# Patient Record
Sex: FEMALE | Race: WHITE | NOT HISPANIC OR LATINO | Employment: OTHER
[De-identification: names, ages, dates, MRNs, and addresses within clinical notes are randomized per-mention and may not be internally consistent; named-entity substitution may affect disease eponyms.]

---

## 2017-07-26 ENCOUNTER — RECORDS - HEALTHEAST (OUTPATIENT)
Dept: ADMINISTRATIVE | Facility: OTHER | Age: 62
End: 2017-07-26

## 2017-08-02 ENCOUNTER — RECORDS - HEALTHEAST (OUTPATIENT)
Dept: ADMINISTRATIVE | Facility: OTHER | Age: 62
End: 2017-08-02

## 2017-08-02 LAB
BKR LAB AP ABNORMAL BLEEDING: NO
BKR LAB AP BIRTH CONTROL/HORMONES: NORMAL
BKR LAB AP CERVICAL APPEARANCE: NORMAL
BKR LAB AP GYN ADEQUACY: NORMAL
BKR LAB AP GYN INTERPRETATION: NORMAL
BKR LAB AP HPV REFLEX: NORMAL
BKR LAB AP LMP: NORMAL
BKR LAB AP PATIENT STATUS: NORMAL
BKR LAB AP PREVIOUS ABNORMAL: NORMAL
BKR LAB AP PREVIOUS NORMAL: 2015
HIGH RISK?: NO
PATH REPORT.COMMENTS IMP SPEC: NORMAL
RESULT FLAG (HE HISTORICAL CONVERSION): NORMAL

## 2017-08-15 ENCOUNTER — RECORDS - HEALTHEAST (OUTPATIENT)
Dept: ADMINISTRATIVE | Facility: OTHER | Age: 62
End: 2017-08-15

## 2018-12-13 ENCOUNTER — RECORDS - HEALTHEAST (OUTPATIENT)
Dept: ADMINISTRATIVE | Facility: OTHER | Age: 63
End: 2018-12-13

## 2018-12-13 ENCOUNTER — RECORDS - HEALTHEAST (OUTPATIENT)
Dept: LAB | Facility: CLINIC | Age: 63
End: 2018-12-13

## 2018-12-13 LAB
ALBUMIN SERPL-MCNC: 4 G/DL (ref 3.5–5)
ALP SERPL-CCNC: 77 U/L (ref 45–120)
ALT SERPL W P-5'-P-CCNC: 22 U/L (ref 0–45)
ANION GAP SERPL CALCULATED.3IONS-SCNC: 9 MMOL/L (ref 5–18)
AST SERPL W P-5'-P-CCNC: 22 U/L (ref 0–40)
BASOPHILS # BLD AUTO: 0 THOU/UL (ref 0–0.2)
BASOPHILS NFR BLD AUTO: 0 % (ref 0–2)
BILIRUB SERPL-MCNC: 0.5 MG/DL (ref 0–1)
BUN SERPL-MCNC: 18 MG/DL (ref 8–22)
CALCIUM SERPL-MCNC: 9.6 MG/DL (ref 8.5–10.5)
CHLORIDE BLD-SCNC: 107 MMOL/L (ref 98–107)
CHOLEST SERPL-MCNC: 183 MG/DL
CO2 SERPL-SCNC: 24 MMOL/L (ref 22–31)
CREAT SERPL-MCNC: 0.82 MG/DL (ref 0.6–1.1)
EOSINOPHIL # BLD AUTO: 0 THOU/UL (ref 0–0.4)
EOSINOPHIL NFR BLD AUTO: 0 % (ref 0–6)
ERYTHROCYTE [DISTWIDTH] IN BLOOD BY AUTOMATED COUNT: 12.9 % (ref 11–14.5)
FASTING STATUS PATIENT QL REPORTED: NO
GFR SERPL CREATININE-BSD FRML MDRD: >60 ML/MIN/1.73M2
GLUCOSE BLD-MCNC: 89 MG/DL (ref 70–125)
HCT VFR BLD AUTO: 43.7 % (ref 35–47)
HDLC SERPL-MCNC: 83 MG/DL
HGB BLD-MCNC: 14.1 G/DL (ref 12–16)
LDLC SERPL CALC-MCNC: 91 MG/DL
LYMPHOCYTES # BLD AUTO: 0.9 THOU/UL (ref 0.8–4.4)
LYMPHOCYTES NFR BLD AUTO: 17 % (ref 20–40)
MCH RBC QN AUTO: 30.8 PG (ref 27–34)
MCHC RBC AUTO-ENTMCNC: 32.3 G/DL (ref 32–36)
MCV RBC AUTO: 95 FL (ref 80–100)
MONOCYTES # BLD AUTO: 0.5 THOU/UL (ref 0–0.9)
MONOCYTES NFR BLD AUTO: 9 % (ref 2–10)
NEUTROPHILS # BLD AUTO: 3.8 THOU/UL (ref 2–7.7)
NEUTROPHILS NFR BLD AUTO: 73 % (ref 50–70)
PLATELET # BLD AUTO: 232 THOU/UL (ref 140–440)
PMV BLD AUTO: 9.6 FL (ref 8.5–12.5)
POTASSIUM BLD-SCNC: 4.3 MMOL/L (ref 3.5–5)
PROLACTIN SERPL-MCNC: 38.5 NG/ML (ref 0–20)
PROT SERPL-MCNC: 7 G/DL (ref 6–8)
RBC # BLD AUTO: 4.58 MILL/UL (ref 3.8–5.4)
SODIUM SERPL-SCNC: 140 MMOL/L (ref 136–145)
TRIGL SERPL-MCNC: 44 MG/DL
TSH SERPL DL<=0.005 MIU/L-ACNC: 1.71 UIU/ML (ref 0.3–5)
WBC: 5.2 THOU/UL (ref 4–11)

## 2019-08-22 ENCOUNTER — OFFICE VISIT - HEALTHEAST (OUTPATIENT)
Dept: INTERNAL MEDICINE | Facility: CLINIC | Age: 64
End: 2019-08-22

## 2019-08-22 DIAGNOSIS — Z12.11 SCREEN FOR COLON CANCER: ICD-10-CM

## 2019-08-22 DIAGNOSIS — R79.89 PROLACTIN INCREASED: ICD-10-CM

## 2019-08-22 ASSESSMENT — MIFFLIN-ST. JEOR: SCORE: 1264.36

## 2019-08-23 ENCOUNTER — RECORDS - HEALTHEAST (OUTPATIENT)
Dept: ADMINISTRATIVE | Facility: OTHER | Age: 64
End: 2019-08-23

## 2019-12-02 ENCOUNTER — OFFICE VISIT - HEALTHEAST (OUTPATIENT)
Dept: INTERNAL MEDICINE | Facility: CLINIC | Age: 64
End: 2019-12-02

## 2019-12-02 ENCOUNTER — COMMUNICATION - HEALTHEAST (OUTPATIENT)
Dept: INTERNAL MEDICINE | Facility: CLINIC | Age: 64
End: 2019-12-02

## 2019-12-02 DIAGNOSIS — M85.831 OSTEOPENIA OF BOTH FOREARMS: ICD-10-CM

## 2019-12-02 DIAGNOSIS — Z78.0 MENOPAUSE: ICD-10-CM

## 2019-12-02 DIAGNOSIS — Z00.00 WELCOME TO MEDICARE PREVENTIVE VISIT: ICD-10-CM

## 2019-12-02 DIAGNOSIS — Z12.31 VISIT FOR SCREENING MAMMOGRAM: ICD-10-CM

## 2019-12-02 DIAGNOSIS — M85.832 OSTEOPENIA OF BOTH FOREARMS: ICD-10-CM

## 2019-12-02 DIAGNOSIS — R79.89 PROLACTIN INCREASED: ICD-10-CM

## 2019-12-02 LAB
ALBUMIN SERPL-MCNC: 3.9 G/DL (ref 3.5–5)
ALP SERPL-CCNC: 73 U/L (ref 45–120)
ALT SERPL W P-5'-P-CCNC: 15 U/L (ref 0–45)
ANION GAP SERPL CALCULATED.3IONS-SCNC: 7 MMOL/L (ref 5–18)
AST SERPL W P-5'-P-CCNC: 18 U/L (ref 0–40)
ATRIAL RATE - MUSE: 59 BPM
BILIRUB SERPL-MCNC: 0.8 MG/DL (ref 0–1)
BUN SERPL-MCNC: 15 MG/DL (ref 8–22)
CALCIUM SERPL-MCNC: 9.2 MG/DL (ref 8.5–10.5)
CHLORIDE BLD-SCNC: 108 MMOL/L (ref 98–107)
CHOLEST SERPL-MCNC: 187 MG/DL
CO2 SERPL-SCNC: 25 MMOL/L (ref 22–31)
CREAT SERPL-MCNC: 0.8 MG/DL (ref 0.6–1.1)
DIASTOLIC BLOOD PRESSURE - MUSE: NORMAL
ERYTHROCYTE [DISTWIDTH] IN BLOOD BY AUTOMATED COUNT: 12.2 % (ref 11–14.5)
FASTING STATUS PATIENT QL REPORTED: YES
GFR SERPL CREATININE-BSD FRML MDRD: >60 ML/MIN/1.73M2
GLUCOSE BLD-MCNC: 87 MG/DL (ref 70–125)
HCT VFR BLD AUTO: 40.1 % (ref 35–47)
HDLC SERPL-MCNC: 76 MG/DL
HGB BLD-MCNC: 13.5 G/DL (ref 12–16)
INTERPRETATION ECG - MUSE: NORMAL
LDLC SERPL CALC-MCNC: 103 MG/DL
MCH RBC QN AUTO: 31.3 PG (ref 27–34)
MCHC RBC AUTO-ENTMCNC: 33.8 G/DL (ref 32–36)
MCV RBC AUTO: 93 FL (ref 80–100)
P AXIS - MUSE: 59 DEGREES
PLATELET # BLD AUTO: 214 THOU/UL (ref 140–440)
PMV BLD AUTO: 6.9 FL (ref 7–10)
POTASSIUM BLD-SCNC: 4.2 MMOL/L (ref 3.5–5)
PR INTERVAL - MUSE: 138 MS
PROLACTIN SERPL-MCNC: 33.9 NG/ML (ref 0–20)
PROT SERPL-MCNC: 6.7 G/DL (ref 6–8)
QRS DURATION - MUSE: 90 MS
QT - MUSE: 430 MS
QTC - MUSE: 425 MS
R AXIS - MUSE: 47 DEGREES
RBC # BLD AUTO: 4.32 MILL/UL (ref 3.8–5.4)
SODIUM SERPL-SCNC: 140 MMOL/L (ref 136–145)
SYSTOLIC BLOOD PRESSURE - MUSE: NORMAL
T AXIS - MUSE: 50 DEGREES
TRIGL SERPL-MCNC: 41 MG/DL
TSH SERPL DL<=0.005 MIU/L-ACNC: 1.82 UIU/ML (ref 0.3–5)
VENTRICULAR RATE- MUSE: 59 BPM
WBC: 4.6 THOU/UL (ref 4–11)

## 2019-12-02 ASSESSMENT — MIFFLIN-ST. JEOR: SCORE: 1264.36

## 2019-12-05 ENCOUNTER — AMBULATORY - HEALTHEAST (OUTPATIENT)
Dept: OTHER | Facility: CLINIC | Age: 64
End: 2019-12-05

## 2020-02-04 ENCOUNTER — HOSPITAL ENCOUNTER (OUTPATIENT)
Dept: MAMMOGRAPHY | Facility: CLINIC | Age: 65
Discharge: HOME | End: 2020-02-04

## 2020-02-04 ENCOUNTER — RECORDS - HEALTHEAST (OUTPATIENT)
Dept: ADMINISTRATIVE | Facility: OTHER | Age: 65
End: 2020-02-04

## 2020-02-04 DIAGNOSIS — Z12.31 VISIT FOR SCREENING MAMMOGRAM: ICD-10-CM

## 2020-02-12 ENCOUNTER — RECORDS - HEALTHEAST (OUTPATIENT)
Dept: ADMINISTRATIVE | Facility: OTHER | Age: 65
End: 2020-02-12

## 2020-02-12 ENCOUNTER — RECORDS - HEALTHEAST (OUTPATIENT)
Dept: RADIOLOGY | Facility: CLINIC | Age: 65
End: 2020-02-12

## 2020-02-23 ENCOUNTER — OFFICE VISIT (OUTPATIENT)
Dept: URGENT CARE | Facility: URGENT CARE | Age: 65
End: 2020-02-23
Payer: COMMERCIAL

## 2020-02-23 VITALS
HEART RATE: 90 BPM | TEMPERATURE: 99.4 F | WEIGHT: 140 LBS | DIASTOLIC BLOOD PRESSURE: 60 MMHG | RESPIRATION RATE: 14 BRPM | BODY MASS INDEX: 21.22 KG/M2 | SYSTOLIC BLOOD PRESSURE: 98 MMHG | HEIGHT: 68 IN

## 2020-02-23 DIAGNOSIS — B34.9 VIRAL SYNDROME: Primary | ICD-10-CM

## 2020-02-23 PROCEDURE — 99202 OFFICE O/P NEW SF 15 MIN: CPT | Performed by: FAMILY MEDICINE

## 2020-02-23 ASSESSMENT — MIFFLIN-ST. JEOR: SCORE: 1228.54

## 2020-02-23 NOTE — PROGRESS NOTES
Subjective: Unusual story in that February 9 she had about 3 or 4 days of feeling sick with vomiting and diarrhea and upper respiratory symptoms.  She felt better 10 days ago and seemed to be back to normal until 3 or 4 days ago when again she had decreased appetite and vomiting but not diarrhea and again got upper respiratory symptoms that are mild.  She feels slightly congested and somewhat achy.  She is not eating much but she tries to drink fluids but it often sets off vomiting.  She feels better today.    Objective: Looks tired but nontoxic.  Vitals are stable.  ENT is normal.  Neck is normal.  Lungs are clear.  Heart is regular without murmurs.  Abdomen is benign    Assessment and plan: Viral symptoms, the recurrence is confusing but I see no signs of a secondary infection.  She is getting better today and hopefully things will just run their course in the next few days.

## 2020-10-03 ENCOUNTER — VIRTUAL VISIT (OUTPATIENT)
Dept: FAMILY MEDICINE | Facility: OTHER | Age: 65
End: 2020-10-03

## 2020-10-03 NOTE — PROGRESS NOTES
"Date: 10/03/2020 10:47:51  Clinician: Jaquan Dawkins  Clinician NPI: [id number]  Patient: Yancy Story  Patient : 1955  Patient Address: [address], [place] [address]  Patient Phone: [phone]  Visit Protocol: URI  Patient Summary:  Yancy is a 65 year old ( : 1955 ) female who initiated a [facility] Visit for COVID-19 (Coronavirus) evaluation and screening. When asked the question \"Please sign me up to receive news, health information and promotions from [facility].\", Yancy responded \"No\".    Yancy states her symptoms started gradually 3-4 days ago.   Her symptoms consist of ear pain, a headache, a cough, nasal congestion, rhinitis, facial pain or pressure, myalgia, malaise, and a sore throat. She is experiencing difficulty breathing due to nasal congestion but she is not short of breath. Yancy also feels feverish.   Symptom details     Nasal secretions: The color of her mucus is clear.    Cough: Yancy coughs a few times an hour and her cough is not more bothersome at night. Phlegm comes into her throat when she coughs. She believes her cough is caused by post-nasal drip. The color of the phlegm is clear.     Sore throat: Yancy reports having mild throat pain (1-3 on a 10 point pain scale), does not have exudate on her tonsils, and can swallow liquids. She is not sure if the lymph nodes in her neck are enlarged. A rash has not appeared on the skin since the sore throat started.     Temperature: Her current temperature is 97.3 degrees Fahrenheit.     Facial pain or pressure: The facial pain or pressure does not feel worse when bending or leaning forward.     Headache: She states the headache is mild (1-3 on a 10 point pain scale).      Yancy denies having wheezing, anosmia, vomiting, nausea, chills, teeth pain, ageusia, and diarrhea. She also denies double sickening (worsening symptoms after initial improvement), having a sinus infection within the past year, taking antibiotic " medication in the past month, and having recent facial or sinus surgery in the past 60 days.   Precipitating events  Yancy is not sure if she has been exposed to someone with strep throat. She has not recently been exposed to someone with influenza. Yancy has not been in close contact with any high risk individuals.   Pertinent COVID-19 (Coronavirus) information  In the past 14 days, Yancy has not worked in a congregate living setting.   She does not work or volunteer as healthcare worker or a  and does not work or volunteer in a healthcare facility.   Yancy also has not lived in a congregate living setting in the past 14 days. She does not live with a healthcare worker.   Yancy has not had a close contact with a laboratory-confirmed COVID-19 patient within 14 days of symptom onset.   Since December 2019, Yancy and has had upper respiratory infection (URI) or influenza-like illness. Has not been diagnosed with lab-confirmed COVID-19 test      Date(s) of previous URI or influenza-like illness (free-text): September 9-12     Symptoms Yancy experienced during previous URI or influenza-like illness as reported by the patient (free-text): Fever of 102 degrees, fatigue, congestion        Pertinent medical history  Yancy does not get yeast infections when she takes antibiotics.   Yancy does not need a return to work/school note.   Weight: 143 lbs   Yancy does not smoke or use smokeless tobacco.   Weight: 143 lbs    MEDICATIONS: No current medications, ALLERGIES: NKDA  Clinician Response:  Dear Yancy,    Your symptoms show that you may have coronavirus (COVID-19). This illness can cause fever, cough and trouble breathing. Many people get a mild case and get better on their own. Some people can get very sick.  What should I do?  We would like to test you for this virus.   1. Please call [phone] to schedule your visit. Explain that you were referred by [facility] to have a COVID-19 test. Be ready to  "share your [facility] visit ID number.  The following will serve as your written order for this COVID Test, ordered by me, for the indication of suspected COVID [Z20.828]: The test will be ordered in [employer], our electronic health record, after you are scheduled. It will show as ordered and authorized by Chung Carrillo MD.  Order: COVID-19 (Coronavirus) PCR for SYMPTOMATIC testing from [facility].      2. When it's time for your COVID test:  Stay at least 6 feet away from others. (If someone will drive you to your test, stay in the backseat, as far away from the  as you can.)   Cover your mouth and nose with a mask, tissue or washcloth.  Go straight to the testing site. Don't make any stops on the way there or back.      3.Starting now: Stay home and away from others (self-isolate) until:   You've had no fever---and no medicine that reduces fever---for one full day (24 hours). And...   Your other symptoms have gotten better. For example, your cough or breathing has improved. And...   At least 10 days have passed since your symptoms started.       During this time, don't leave the house except for testing or medical care.   Stay in your own room, even for meals. Use your own bathroom if you can.   Stay away from others in your home. No hugging, kissing or shaking hands. No visitors.  Don't go to work, school or anywhere else.    Clean \"high touch\" surfaces often (doorknobs, counters, handles, etc.). Use a household cleaning spray or wipes. You'll find a full list of  on the EPA website: www.epa.gov/pesticide-registration/list-n-disinfectants-use-against-sars-cov-2.   Cover your mouth and nose with a mask, tissue or washcloth to avoid spreading germs.  Wash your hands and face often. Use soap and water.  Caregivers in these groups are at risk for severe illness due to COVID-19:  o People 65 years and older  o People who live in a nursing home or long-term care facility  o People with chronic disease (lung, heart, " cancer, diabetes, kidney, liver, immunologic)  o People who have a weakened immune system, including those who:   Are in cancer treatment  Take medicine that weakens the immune system, such as corticosteroids  Had a bone marrow or organ transplant  Have an immune deficiency  Have poorly controlled HIV or AIDS  Are obese (body mass index of 40 or higher)  Smoke regularly   o Caregivers should wear gloves while washing dishes, handling laundry and cleaning bedrooms and bathrooms.  o Use caution when washing and drying laundry: Don't shake dirty laundry, and use the warmest water setting that you can.  o For more tips, go to www.cdc.gov/coronavirus/2019-ncov/downloads/10Things.pdf.    4.Sign up for [employer]. We know it's scary to hear that you might have COVID-19. We want to track your symptoms to make sure you're okay over the next 2 weeks. Please look for an email from [employer]---this is a free, online program that we'll use to keep in touch. To sign up, follow the link in the email. Learn more at [email or web address]  How can I take care of myself?   Get lots of rest. Drink extra fluids (unless a doctor has told you not to).   Take Tylenol (acetaminophen) for fever or pain. If you have liver or kidney problems, ask your family doctor if it's okay to take Tylenol.   Adults can take either:    650 mg (two 325 mg pills) every 4 to 6 hours, or...   1,000 mg (two 500 mg pills) every 8 hours as needed.    Note: Don't take more than 3,000 mg in one day. Acetaminophen is found in many medicines (both prescribed and over-the-counter medicines). Read all labels to be sure you don't take too much.   For children, check the Tylenol bottle for the right dose. The dose is based on the child's age or weight.    If you have other health problems (like cancer, heart failure, an organ transplant or severe kidney disease): Call your specialty clinic if you don't feel better in the next 2 days.       Know when to  call 331. Emergency warning signs include:    Trouble breathing or shortness of breath Pain or pressure in the chest that doesn't go away Feeling confused like you haven't felt before, or not being able to wake up Bluish-colored lips or face.  Where can I get more information?   [facility] -- About COVID-19: [email or web address]   CDC -- What to Do If You're Sick: www.cdc.gov/coronavirus/2019-ncov/about/steps-when-sick.html   CDC -- Ending Home Isolation: www.cdc.gov/coronavirus/2019-ncov/hcp/disposition-in-home-patients.html   CDC -- Caring for Someone: www.cdc.gov/coronavirus/2019-ncov/if-you-are-sick/care-for-someone.html   [facility] -- Interim Guidance for Hospital Discharge to Home: www.[facility].[place]./diseases/coronavirus/hcp/hospdischarge.pdf   [facility] clinical trials (COVID-19 research studies): clinicalaffairs.[facility]-clinical-trials    Below are the COVID-19 hotlines at the [place] Department of Health ([facility]). Interpreters are available.    For health questions: Call 582-680-2042 or 1-543.641.8903 (7 a.m. to 7 p.m.) For questions about schools and childcare: Call 012-207-9891 or 1-455.851.6165 (7 a.m. to 7 p.m.)      Diagnosis: Cough  Diagnosis ICD: R05

## 2020-10-05 ENCOUNTER — AMBULATORY - HEALTHEAST (OUTPATIENT)
Dept: FAMILY MEDICINE | Facility: CLINIC | Age: 65
End: 2020-10-05

## 2020-10-05 DIAGNOSIS — Z20.822 SUSPECTED COVID-19 VIRUS INFECTION: ICD-10-CM

## 2020-10-07 ENCOUNTER — COMMUNICATION - HEALTHEAST (OUTPATIENT)
Dept: SCHEDULING | Facility: CLINIC | Age: 65
End: 2020-10-07

## 2020-10-20 ENCOUNTER — VIRTUAL VISIT (OUTPATIENT)
Dept: FAMILY MEDICINE | Facility: OTHER | Age: 65
End: 2020-10-20

## 2020-10-20 NOTE — PROGRESS NOTES
"Date: 10/20/2020 17:14:42  Clinician: Selin Amaral  Clinician NPI: [id number]  Patient: Yancy Story  Patient : 1955  Patient Address: [address], [place] [address]  Patient Phone: [phone]  Visit Protocol: URI  Patient Summary:  Yancy is a 65 year old ( : 1955 ) female who initiated a [facility] Visit for cold, sinus infection, or influenza. When asked the question \"Please sign me up to receive news, health information and promotions from [facility].\", Yancy responded \"Yes\".    Yancy states her symptoms started suddenly 2-3 weeks ago. After her symptoms started, they improved and then got worse again.   Her symptoms consist of wheezing, a cough, nasal congestion, rhinitis, facial pain or pressure, and malaise. She is experiencing difficulty breathing due to nasal congestion but she is not short of breath.   Symptom details     Nasal secretions: The color of her mucus is clear.    Cough: Yancy coughs a few times an hour and her cough is more bothersome at night. Phlegm comes into her throat when she coughs. She believes her cough is caused by post-nasal drip. The color of the phlegm is clear.     Wheezing: Yancy has not ever been diagnosed with asthma. Additional wheezing details as reported by the patient (free text): Wheezing occurs when coughing seems to be in throat or lungs.       Facial pain or pressure: The facial pain or pressure feels worse when bending over or leaning forward.      Yancy denies having ear pain, headache, fever, anosmia, vomiting, nausea, myalgias, chills, sore throat, teeth pain, ageusia, and diarrhea. She also denies having a sinus infection within the past year, taking antibiotic medication in the past month, and having recent facial or sinus surgery in the past 60 days.   Precipitating events  She has not recently been exposed to someone with influenza. Yancy has not been in close contact with any high risk individuals.   Pertinent COVID-19 " (Coronavirus) information  In the past 14 days, Yancy has not worked in a congregate living setting.   She does not work or volunteer as healthcare worker or a  and does not work or volunteer in a healthcare facility.   Yancy also has not lived in a congregate living setting in the past 14 days. She does not live with a healthcare worker.   Yancy has not had a close contact with a laboratory-confirmed COVID-19 patient within 14 days of symptom onset.   Since December 2019, Yancy and has not had upper respiratory infection or influenza-like illness. Has not been diagnosed with lab-confirmed COVID-19 test   Pertinent medical history  Yancy does not get yeast infections when she takes antibiotics.   Yancy does not need a return to work/school note.   Weight: 145 lbs   Yancy does not smoke or use smokeless tobacco.   Additional information as reported by the patient (free text): I had a CoVID test October 5, 2020 a few days after feeling these symptoms.  It was negative.  I have felt better some days since then but the cough and post nasal drip have not cleared up since I got them.   Weight: 145 lbs    MEDICATIONS: No current medications, ALLERGIES: NKDA  Clinician Response:  Dear Yancy,  Based on the information provided, you have acute bacterial sinusitis, also known as a sinus infection. Sinus infections are caused by bacteria or a virus and symptoms are almost always identical. The difference between the 2 types of infections is timing.  Sinus infections start as viral infections and symptoms improve on their own in about 7 days. If symptoms have not improved after 7 days or have even worsened, a bacterial infection may have developed.  Medication information  I am prescribing:       Fluticasone 50 mcg/actuation nasal spray. Inhale 2 sprays in each nostril 1 time per day; after 1 week, may adjust to 1 - 2 sprays in each nostril 1 time per day. This medication takes several days to start  working, so keep taking it even if it doesn't help right away. There are no refills with this prescription.      Doxycycline hyclate 100 mg oral tablet. Take 1 tablet by mouth 2 times per day for 7 days. There are no refills with this prescription.      Benzonatate (Tessalon Perles) 100 mg oral capsule. Take 1-2 capsules by mouth 3 times per day as needed for your cough. There are no refills with this prescription.      Ventolin HFA 90 mcg/actuation aerosol inhaler. Inhale 2 puffs every 4-6 hours as needed for 5 days. There are no refills with this prescription.     Certain antibiotics such as Doxycycline, levofloxacin, and moxifloxacin can cause your skin to be more sensitive to the sun. Exposure to the sun when taking these antibiotics may cause skin rash, itching, redness, or a severe sunburn. Be sure to avoid prolonged or direct exposure to the sun, especially between 10 am and 3 pm, if possible. Wear protective clothing and use sunscreen of SPF 30 or higher when you are outdoors.  Yeast infections can be a common side effect of antibiotics. The most common symptom of a yeast infection is itchiness in and around the vagina. Other signs and symptoms include burning, redness, or a thick, white vaginal discharge that looks like cottage cheese and does not have a bad smell.  Self care  Steps you can take to be as comfortable as possible:     Rest.    Drink plenty of fluids.    Take a warm shower to loosen congestion    Use a cool-mist humidifier.    Take a spoonful of honey to reduce your cough.     When to seek care  Please be seen in a clinic or urgent care if any of the following occur:     New symptoms develop, or symptoms become worse    Symptoms do not start to improve after 3 days of treatment     Call ahead before going to the clinic or urgent care.  It is possible to have an allergic reaction to an antibiotic even if you have not had one in the past. If you notice a new rash, significant swelling, or  difficulty breathing, stop taking this medication immediately and go to a clinic or urgent care.  COVID-19 (Coronavirus) General Information  Because there is currently no vaccine to prevent infection, the best way to protect yourself is to avoid being exposed to this virus. Common symptoms of COVID-19 include but are not limited to fever, cough, and shortness of breath. These symptoms appear 2-14 days after you are exposed to the virus that causes COVID-19. Click here for more information from the CDC on how to protect yourself.  If you are sick with COVID-19 or suspect you are infected with the virus that causes COVID-19, follow the steps here from the CDC to help prevent the disease from spreading to people in your home and community.  Click here for general information from the CDC on testing.  If you develop any of these emergency warning signs for COVID-19, get medical attention immediately:     Trouble breathing    Persistent pain or pressure in the chest    New confusion or inability to arouse    Bluish lips or face      Call your doctor or clinic before going in. Call 911 if you have a medical emergency and notify the  you have or think you may have COVID-19.  For more detailed and up to date information on COVID-19 (Coronavirus), please visit the CDC website.   Diagnosis: Acute bacterial sinusitis  Diagnosis ICD: J01.90  Prescription: doxycycline hyclate 100 mg oral tablet 14 tablet, 7 days supply. Take 1 tablet by mouth 2 times per day for 7 days. Refills: 0, Refill as needed: no, Allow substitutions: yes  Prescription: fluticasone 50 mcg/actuation nasal spray,suspension 1 120 spray aerosol with adapter (grams), 30 days supply. Inhale 2 sprays in each nostril 1 time per day; after 1 week, may adjust to 1 - 2 sprays in each nostril 1 time per day.. Refills: 0, Refill as needed: no, Allow substitutions: yes  Prescription: Ventolin HFA 90 mcg/actuation inhalation HFA aerosol inhaler 1 200 inhalation  canister, 5 days supply. Inhale 2 puffs every 4-6 hours as needed for 5 days. Refills: 0, Refill as needed: no, Allow substitutions: yes  Prescription: benzonatate (Tessalon Perles) 100 mg oral capsule 30 capsule, 5 days supply. Take 1-2 capsules by mouth 3 times per day as needed. Refills: 0, Refill as needed: no, Allow substitutions: yes  Pharmacy: [id number] - [phone] - [address], [place] [address]

## 2020-10-27 ENCOUNTER — OFFICE VISIT - HEALTHEAST (OUTPATIENT)
Dept: INTERNAL MEDICINE | Facility: CLINIC | Age: 65
End: 2020-10-27

## 2020-10-27 DIAGNOSIS — R05.9 COUGH: ICD-10-CM

## 2020-10-27 DIAGNOSIS — J32.9 SINUSITIS, UNSPECIFIED CHRONICITY, UNSPECIFIED LOCATION: ICD-10-CM

## 2020-10-29 ENCOUNTER — COMMUNICATION - HEALTHEAST (OUTPATIENT)
Dept: SCHEDULING | Facility: CLINIC | Age: 65
End: 2020-10-29

## 2020-10-30 ENCOUNTER — COMMUNICATION - HEALTHEAST (OUTPATIENT)
Dept: INTERNAL MEDICINE | Facility: CLINIC | Age: 65
End: 2020-10-30

## 2020-12-03 ENCOUNTER — OFFICE VISIT - HEALTHEAST (OUTPATIENT)
Dept: INTERNAL MEDICINE | Facility: CLINIC | Age: 65
End: 2020-12-03

## 2020-12-03 DIAGNOSIS — G47.33 OSA (OBSTRUCTIVE SLEEP APNEA): ICD-10-CM

## 2020-12-03 DIAGNOSIS — E55.9 VITAMIN D DEFICIENCY: ICD-10-CM

## 2020-12-03 DIAGNOSIS — R79.89 PROLACTIN INCREASED: ICD-10-CM

## 2020-12-03 DIAGNOSIS — Z13.220 SCREENING FOR HYPERLIPIDEMIA: ICD-10-CM

## 2020-12-03 DIAGNOSIS — M81.6 LOCALIZED OSTEOPOROSIS (LEQUESNE): ICD-10-CM

## 2020-12-03 DIAGNOSIS — R09.82 POSTNASAL DRIP: ICD-10-CM

## 2020-12-03 DIAGNOSIS — Z00.00 WELLNESS EXAMINATION: ICD-10-CM

## 2020-12-03 LAB
ALBUMIN SERPL-MCNC: 4.1 G/DL (ref 3.5–5)
ALP SERPL-CCNC: 107 U/L (ref 45–120)
ALT SERPL W P-5'-P-CCNC: 28 U/L (ref 0–45)
ANION GAP SERPL CALCULATED.3IONS-SCNC: 8 MMOL/L (ref 5–18)
AST SERPL W P-5'-P-CCNC: 23 U/L (ref 0–40)
BILIRUB SERPL-MCNC: 0.5 MG/DL (ref 0–1)
BUN SERPL-MCNC: 18 MG/DL (ref 8–22)
CALCIUM SERPL-MCNC: 9 MG/DL (ref 8.5–10.5)
CHLORIDE BLD-SCNC: 106 MMOL/L (ref 98–107)
CHOLEST SERPL-MCNC: 205 MG/DL
CO2 SERPL-SCNC: 27 MMOL/L (ref 22–31)
CREAT SERPL-MCNC: 0.79 MG/DL (ref 0.6–1.1)
ERYTHROCYTE [DISTWIDTH] IN BLOOD BY AUTOMATED COUNT: 12 % (ref 11–14.5)
FASTING STATUS PATIENT QL REPORTED: YES
GFR SERPL CREATININE-BSD FRML MDRD: >60 ML/MIN/1.73M2
GLUCOSE BLD-MCNC: 85 MG/DL (ref 70–125)
HCT VFR BLD AUTO: 44.1 % (ref 35–47)
HDLC SERPL-MCNC: 80 MG/DL
HGB BLD-MCNC: 14.7 G/DL (ref 12–16)
LDLC SERPL CALC-MCNC: 116 MG/DL
MCH RBC QN AUTO: 31.6 PG (ref 27–34)
MCHC RBC AUTO-ENTMCNC: 33.3 G/DL (ref 32–36)
MCV RBC AUTO: 95 FL (ref 80–100)
PLATELET # BLD AUTO: 252 THOU/UL (ref 140–440)
PMV BLD AUTO: 6.8 FL (ref 7–10)
POTASSIUM BLD-SCNC: 4.5 MMOL/L (ref 3.5–5)
PROLACTIN SERPL-MCNC: 32.1 NG/ML (ref 0–20)
PROT SERPL-MCNC: 6.8 G/DL (ref 6–8)
PTH-INTACT SERPL-MCNC: 85 PG/ML (ref 10–86)
RBC # BLD AUTO: 4.65 MILL/UL (ref 3.8–5.4)
SODIUM SERPL-SCNC: 141 MMOL/L (ref 136–145)
TRIGL SERPL-MCNC: 46 MG/DL
TSH SERPL DL<=0.005 MIU/L-ACNC: 2.17 UIU/ML (ref 0.3–5)
WBC: 4.8 THOU/UL (ref 4–11)

## 2020-12-03 RX ORDER — LORATADINE 10 MG/1
10 TABLET ORAL DAILY
Qty: 30 TABLET | Refills: 2 | Status: SHIPPED
Start: 2020-12-03

## 2020-12-03 ASSESSMENT — MIFFLIN-ST. JEOR: SCORE: 1264.36

## 2020-12-04 LAB
25(OH)D3 SERPL-MCNC: 22.2 NG/ML (ref 30–80)
25(OH)D3 SERPL-MCNC: 22.2 NG/ML (ref 30–80)

## 2021-05-26 ENCOUNTER — RECORDS - HEALTHEAST (OUTPATIENT)
Dept: ADMINISTRATIVE | Facility: OTHER | Age: 66
End: 2021-05-26

## 2021-05-26 VITALS — HEART RATE: 82 BPM | SYSTOLIC BLOOD PRESSURE: 90 MMHG | DIASTOLIC BLOOD PRESSURE: 62 MMHG | OXYGEN SATURATION: 97 %

## 2021-05-31 NOTE — PROGRESS NOTES
"  Office Visit   Yancy Story   64 y.o. female    Date of Visit: 8/22/2019    Chief Complaint   Patient presents with     [facility] visit        Assessment and Plan   1. Screen for colon cancer  She is due for colon cancer screening and would like to proceed with the Cologuard test.  There is no family history of colon cancer and she has not had polyps in the past.  - Cologuard    2. Prolactin increased (H)  She is going to be due for a general exam in November.  We will plan to recheck her prolactin level at that time.         Return in about 3 months (around 11/22/2019) for Annual physical.     History of Present Illness   This 64 y.o. old comes in to [facility].  She is not on any chronic medical problems.  She recently had to pick a new doctor.  She is going to be due for some routine health maintenance in November and December.  We will plan to have her come in for a general exam at that time.  She has no acute concerns today.  We did discuss colon cancer screening and she would like to proceed with the Cologuard.    Review of Systems: As above, systems otherwise reviewed and negative.     Medications, Allergies and Problem List   Patient Active Problem List   Diagnosis     Prolactin increased (H)     No current outpatient medications on file.     No current facility-administered medications for this visit.      No Known Allergies       Physical Exam     /78 (Patient Site: Left Arm, Patient Position: Sitting)   Pulse 82   Ht 5' 8\" (1.727 m)   Wt 149 lb (67.6 kg)   SpO2 96%   BMI 22.66 kg/m      General: This is an alert female in no apparent distress.     Additional Information   Social History     Tobacco Use     Smoking status: Never Smoker     Smokeless tobacco: Never Used   Substance Use Topics     Alcohol use: Not on file     Comment: 3-4 days a week     Drug use: Never            Claire Pond MD    "

## 2021-06-03 VITALS — HEIGHT: 68 IN | WEIGHT: 149 LBS | BODY MASS INDEX: 22.58 KG/M2

## 2021-06-03 NOTE — PATIENT INSTRUCTIONS - HE
Patient Education   Personalized Prevention Plan  You are due for the preventive services outlined below.  Your care team is available to assist you in scheduling these services.  If you have already completed any of these items, please share that information with your care team to update in your medical record.  Health Maintenance   Topic Date Due     HEPATITIS C SCREENING  1955     HIV SCREENING  08/08/1969     TD 18+ HE  08/08/1972     TDAP ADULT ONE TIME DOSE  08/08/1972     ADVANCE CARE PLANNING  08/08/1972     ZOSTER VACCINES (1 of 2) 08/08/2004     COLOGUARD  08/08/2004     MAMMOGRAM  06/17/2015     MEDICARE ANNUAL WELLNESS VISIT  08/08/2019     DXA SCAN  08/08/2019     PNEUMOCOCCAL IMMUNIZATION 65+ LOW/MEDIUM RISK (1 of 2 - PCV13) 08/08/2019     FALL RISK ASSESSMENT  08/22/2020     LIPID  12/13/2023     INFLUENZA VACCINE RULE BASED  Completed

## 2021-06-03 NOTE — PROGRESS NOTES
Assessment and Plan:     1. Welcome to Medicare preventive visit  She has a satisfactory examination today.  We will provide the Pneumovax.  We will do an EKG and set her up for a bone density and mammogram.  She is fasting today and will get lipids and a glucose.  She is otherwise up-to-date on age-appropriate health maintenance.  We did review the documentation for her wellness evaluation.  She has no difficulties with ADLs and no risk of falls.  Her memory screening is normal.  She has an advanced directive.  - Electrocardiogram Perform and Read  - Lipid Cascade  - DXA Bone Density Scan; Future    2. Visit for screening mammogram  - Mammo Screening Bilateral; Future    3. Prolactin increased (H)  We will plan to recheck this today.  - HM2(CBC w/o Differential)  - Comprehensive Metabolic Panel  - Thyroid Cascade  - Prolactin    4. Menopause  - DXA Bone Density Scan; Future    The patient's current medical problems were reviewed.    I have had an Advance Directives discussion with the patient.  The following health maintenance schedule was reviewed with the patient and provided in printed form in the after visit summary:   Health Maintenance   Topic Date Due     HEPATITIS C SCREENING  1955     HIV SCREENING  08/08/1969     TD 18+ HE  08/08/1972     TDAP ADULT ONE TIME DOSE  08/08/1972     ADVANCE CARE PLANNING  08/08/1972     ZOSTER VACCINES (1 of 2) 08/08/2004     COLOGUARD  08/08/2004     MAMMOGRAM  06/17/2015     MEDICARE ANNUAL WELLNESS VISIT  08/08/2019     DXA SCAN  08/08/2019     PNEUMOCOCCAL IMMUNIZATION 65+ LOW/MEDIUM RISK (1 of 2 - PCV13) 08/08/2019     FALL RISK ASSESSMENT  08/22/2020     LIPID  12/13/2023     INFLUENZA VACCINE RULE BASED  Completed        Subjective:   Chief Complaint: Yancy Story is an 64 y.o. female here for a Welcome to Medicare visit.   HPI: She comes in for a welcome to Medicare visit.  She has no chronic medical problems.  She is not on any medication.  She is due for  a bone density screen as well as a mammogram.  She is due for pneumonia vaccine.  She had the Cologuard testing for screening for colon cancer recently and that was negative.  She is fasting today and would like to proceed with a lipid panel and glucose.  She has no problems with falls and no memory concerns.  She has an advanced directive.  She is active and is a non-smoker.  She has no acute concerns today.    Review of Systems:   Please see above.  The rest of the review of systems are negative for all systems.    Patient Care Team:  Claire Pond MD as PCP - General (Internal Medicine)  Claire Pond MD as Assigned PCP     Patient Active Problem List   Diagnosis     Prolactin increased (H)     No past medical history on file.   History reviewed. No pertinent surgical history.   Family History   Problem Relation Age of Onset     Breast cancer Mother          in her 90's.     Heart attack Father      Heart failure Sister       Social History     Socioeconomic History     Marital status:      Spouse name: Not on file     Number of children: 1     Years of education: Not on file     Highest education level: Not on file   Occupational History     Occupation: Retired from [employer]   Social Needs     Financial resource strain: Not on file     Food insecurity:     Worry: Not on file     Inability: Not on file     Transportation needs:     Medical: Not on file     Non-medical: Not on file   Tobacco Use     Smoking status: Never Smoker     Smokeless tobacco: Never Used   Substance and Sexual Activity     Alcohol use: Not on file     Comment: 3-4 days a week     Drug use: Never     Sexual activity: Yes     Partners: Male   Lifestyle     Physical activity:     Days per week: Not on file     Minutes per session: Not on file     Stress: Not on file   Relationships     Social connections:     Talks on phone: Not on file     Gets together: Not on file     Attends [employer] service: Not on file      "Active member of club or organization: Not on file     Attends meetings of clubs or organizations: Not on file     Relationship status: Not on file     Intimate partner violence:     Fear of current or ex partner: Not on file     Emotionally abused: Not on file     Physically abused: Not on file     Forced sexual activity: Not on file   Other Topics Concern     Not on file   Social History Narrative     Not on file       No current outpatient medications on file.     No current facility-administered medications for this visit.       Objective:   Vital Signs:   Visit Vitals  /70 (Patient Site: Right Arm, Patient Position: Sitting)   Pulse 80   Ht 5' 8\" (1.727 m)   Wt 149 lb (67.6 kg)   SpO2 97%   BMI 22.66 kg/m         EKG:  Sinus rhythm with PAC's.      VisionScreening:  No Correction:  Left eye 20/32, Right eye 20/32, Both eyes 20/25    PHYSICAL EXAM  General:  Patient is alert and in no apparent distress.  Neck:  Supple with no adenopathy or thyroid abnormality noted.  Breast:  Normal breast tissues with no masses or adenopathy noted.  Cardiovascular:  Regular rate and rhythm, normal S1/S2, no murmurs, rubs, or gallop.  Pulmonary:  Lungs are clear to auscultation bilaterally with normal respiratory effort.  Gastrointestinal:  Abdomen is soft, non-tender, non-distended, with no organomegaly, rebound or guarding.  Extremities:  No joint abnormalities with no LE edema.  Strong dorsalis pedis pulses.  Neurologic Cranial nerves are intact.  No focal deficits.  Psychiatric:  Pleasant, no confusion or agitation   Skin:  Warm and well perfused with no rashes or abnormalities.  Pelvic: Normal external genitalia and normal hair distribution.  Bimanual exam with no uterine enlargement and no pelvic masses.        No flowsheet data found.  A Mini Cog score of 0-2 suggests the possibility of dementia, score of 3-5 suggests no dementia    Identified Health Risks:     "

## 2021-06-04 VITALS
HEIGHT: 68 IN | WEIGHT: 149 LBS | DIASTOLIC BLOOD PRESSURE: 70 MMHG | SYSTOLIC BLOOD PRESSURE: 102 MMHG | BODY MASS INDEX: 22.58 KG/M2 | HEART RATE: 80 BPM | OXYGEN SATURATION: 97 %

## 2021-06-05 VITALS
DIASTOLIC BLOOD PRESSURE: 62 MMHG | WEIGHT: 149 LBS | OXYGEN SATURATION: 99 % | TEMPERATURE: 97.4 F | SYSTOLIC BLOOD PRESSURE: 90 MMHG | BODY MASS INDEX: 22.58 KG/M2 | HEART RATE: 84 BPM | HEIGHT: 68 IN

## 2021-06-12 NOTE — PROGRESS NOTES
Office Visit   Yancy Story   65 y.o. female    Date of Visit: 10/27/2020    Chief Complaint   Patient presents with     Fatigue     Cough, post nasal drip, facial pressure, no temp        Assessment and Plan   1. Cough  She has now had a cough with sinus pressure for about 3 weeks.  Initially she had a Covid test that was negative.  I do not think this is Covid but I think we should do another swab and I have done that today.  I am going to treat her for sinus infection with Augmentin and prednisone.  If she is not improving she will let me know.  - Symptomatic COVID-19 Virus (CORONAVIRUS) PCR  - amoxicillin-clavulanate (AUGMENTIN) 875-125 mg per tablet; Take 1 tablet by mouth 2 (two) times a day for 14 days.  Dispense: 28 tablet; Refill: 0  - predniSONE (DELTASONE) 10 mg tablet; Take 40mg by mouth daily for 3 days, then 30mg x 3 days, then 20mg x 3 days ,then 10mg x 3 days then stop.  Dispense: 30 tablet; Refill: 0    2. Sinusitis, unspecified chronicity, unspecified location  - amoxicillin-clavulanate (AUGMENTIN) 875-125 mg per tablet; Take 1 tablet by mouth 2 (two) times a day for 14 days.  Dispense: 28 tablet; Refill: 0  - predniSONE (DELTASONE) 10 mg tablet; Take 40mg by mouth daily for 3 days, then 30mg x 3 days, then 20mg x 3 days ,then 10mg x 3 days then stop.  Dispense: 30 tablet; Refill: 0         No follow-ups on file.     History of Present Illness   This 65 y.o. old female comes in with ongoing sinus pain, congestion and cough.  She has had symptoms now for about 3 weeks.  Initially she was tested for Covid and it was negative.  Last week she was treated for a sinus infection with doxycycline but her symptoms have not improved.  She is not having any fevers but does continue to have quite a bit of congestion, drainage and a cough.  She is not feeling short of breath and not having any fevers or chills.    Review of Systems: As above, systems otherwise reviewed and negative.     Medications,  Allergies and Problem List   Patient Active Problem List   Diagnosis     Prolactin increased     Current Outpatient Medications   Medication Sig Dispense Refill     albuterol (PROAIR HFA;PROVENTIL HFA;VENTOLIN HFA) 90 mcg/actuation inhaler INHALE 2 PUFFS BY MOUTH EVERY 4 TO 6 HOURS AS NEEDED FOR 5 DAYS       amoxicillin-clavulanate (AUGMENTIN) 875-125 mg per tablet Take 1 tablet by mouth 2 (two) times a day for 14 days. 28 tablet 0     benzonatate (TESSALON) 100 MG capsule TAKE 1 TO 2 CAPSULES BY MOUTH 3 TIMES A DAY AS NEEDED       fluticasone propionate (FLONASE) 50 mcg/actuation nasal spray 2 SPRAYS IN EACH NOSTRIL ONCE PER DAY. AFTER 1 WEEK, MAY ADJUST TO 1 2 SPRAYS EACH NOSTRIL ONCE/DAY       predniSONE (DELTASONE) 10 mg tablet Take 40mg by mouth daily for 3 days, then 30mg x 3 days, then 20mg x 3 days ,then 10mg x 3 days then stop. 30 tablet 0     No current facility-administered medications for this visit.      No Known Allergies       Physical Exam     BP 90/62 (Patient Site: Right Arm, Patient Position: Sitting)   Pulse 82   LMP  (LMP Unknown)   SpO2 97%     General:  Patient is alert and in no apparent distress.  HEENT: Mild pain with palpation over the left maxillary sinus.  Cardiovascular:  Regular rate and rhythm, normal S1/S2, no murmurs, rubs, or gallop.  Pulmonary:  Lungs are clear to auscultation bilaterally with normal respiratory effort.  She has scattered wheezes throughout but no rales.           Additional Information   Social History     Tobacco Use     Smoking status: Never Smoker     Smokeless tobacco: Never Used   Substance Use Topics     Alcohol use: Not on file     Comment: 3-4 days a week     Drug use: Never            Claire Pond MD

## 2021-06-13 NOTE — PATIENT INSTRUCTIONS - HE
Advance Directive  Patient s advance directive was discussed and I am comfortable with the patient s wishes.  Patient Education   Personalized Prevention Plan  You are due for the preventive services outlined below.  Your care team is available to assist you in scheduling these services.  If you have already completed any of these items, please share that information with your care team to update in your medical record.  Health Maintenance   Topic Date Due     TD 18+ HE  08/08/1972     ZOSTER VACCINES (1 of 2) 08/08/2004     Pneumococcal Vaccine: 65+ Years (2 of 2 - PPSV23) 12/02/2020     MEDICARE ANNUAL WELLNESS VISIT  12/03/2021     FALL RISK ASSESSMENT  12/03/2021     MAMMOGRAM  02/04/2022     COLORECTAL CANCER SCREENING  08/23/2022     LIPID  12/02/2024     ADVANCE CARE PLANNING  12/05/2024     DEXA SCAN  02/04/2035     INFLUENZA VACCINE RULE BASED  Completed     Pneumococcal Vaccine: Pediatrics (0 to 5 Years) and At-Risk Patients (6 to 64 Years)  Aged Out     HEPATITIS B VACCINES  Aged Out     HEPATITIS C SCREENING  Discontinued

## 2021-06-13 NOTE — PROGRESS NOTES
Assessment and Plan:     1. Wellness examination  She has a normal examination today.  She is due for tetanus vaccine and shingles and will get those at the pharmacy.  She will be due for a mammogram in February.  She is up-to-date on colon cancer screening and bone density screening.  We reviewed the documentation for her wellness visit and she has no problems with falls or activities of daily living.  She has an advanced directive.  Memory screenings normal.  She is otherwise up-to-date on age-appropriate health maintenance.    2. Postnasal drip  She has chronic symptoms of postnasal drip and some snoring.  I am going to check her blood work today but also have her take Claritin as well as use Flonase at bedtime.  Hopefully this will help with her symptoms.  If she is continuing to have a lot of trouble with snoring over the next couple of months she will let me know and we could have her evaluated in the sleep clinic.  - fluticasone propionate (FLONASE) 50 mcg/actuation nasal spray; 2 sprays into each nostril daily.  Dispense: 16 g; Refill: 0  - loratadine (CLARITIN) 10 mg tablet; Take 1 tablet (10 mg total) by mouth daily.  Dispense: 30 tablet; Refill: 2  - HM2(CBC w/o Differential)    3. JOHAN (obstructive sleep apnea)  As noted above her  feels that she might have sleep apnea.  She will let me know if she would like to see a sleep specialist.    4. Prolactin increased  We have been monitoring her prolactin and we will recheck that again today.  We will also check her metabolic panel and thyroid and glucose.  - Comprehensive Metabolic Panel  - Prolactin  - Parathyroid Hormone Intact    5. Vitamin D deficiency  - Vitamin D, Total (25-Hydroxy)    6. Screening for hyperlipidemia  - Lipid Bland    7. Localized osteoporosis ([place])   - Thyroid Cascade      The patient's current medical problems were reviewed.      The following health maintenance schedule was reviewed with the patient and provided in  printed form in the after visit summary:   Health Maintenance   Topic Date Due     TD 18+ HE  1972     ZOSTER VACCINES (1 of 2) 2004     Pneumococcal Vaccine: 65+ Years (2 of 2 - PPSV23) 2020     MEDICARE ANNUAL WELLNESS VISIT  2021     FALL RISK ASSESSMENT  2021     MAMMOGRAM  2022     COLORECTAL CANCER SCREENING  2022     LIPID  2024     ADVANCE CARE PLANNING  2024     DEXA SCAN  2035     INFLUENZA VACCINE RULE BASED  Completed     Pneumococcal Vaccine: Pediatrics (0 to 5 Years) and At-Risk Patients (6 to 64 Years)  Aged Out     HEPATITIS B VACCINES  Aged Out     HEPATITIS C SCREENING  Discontinued        Subjective:   Chief Complaint: Yancy Story is an 65 y.o. female here for an Annual Wellness visit.   HPI: She comes in for a general exam.  We reviewed the documentation for her wellness visit.  She has not had any problems with activities of daily living, falls, or memory.  She has an advanced directive.  She is up-to-date on mammograms, Pap smears, colon cancer screening.  She is fasting today and we will recheck her labs.  She is due for a tetanus vaccine and a shingles and will get those at the pharmacy.  Her only concern is that she has postnasal drainage and congestion.  It is chronic.  Also notes that she snores at night and her 's concerned that she may have sleep apnea.  She has no other acute concerns today.    Review of Systems:   Please see above.  The rest of the review of systems are negative for all systems.    Patient Care Team:  Claire Pond MD as PCP - General (Internal Medicine)  Claire Pond MD as Assigned PCP     Patient Active Problem List   Diagnosis     Prolactin increased     History reviewed. No pertinent past medical history.   History reviewed. No pertinent surgical history.   Family History   Problem Relation Age of Onset     Breast cancer Mother          in her 90's.     Heart attack Father       "Heart failure Sister       Social History     Socioeconomic History     Marital status:      Spouse name: Not on file     Number of children: 1     Years of education: Not on file     Highest education level: Not on file   Occupational History     Occupation: Retired from [employer]   Social Needs     Financial resource strain: Not on file     Food insecurity     Worry: Not on file     Inability: Not on file     Transportation needs     Medical: Not on file     Non-medical: Not on file   Tobacco Use     Smoking status: Never Smoker     Smokeless tobacco: Never Used   Substance and Sexual Activity     Alcohol use: Not on file     Comment: 3-4 days a week     Drug use: Never     Sexual activity: Yes     Partners: Male   Lifestyle     Physical activity     Days per week: Not on file     Minutes per session: Not on file     Stress: Not on file   Relationships     Social connections     Talks on phone: Not on file     Gets together: Not on file     Attends Qu[employer] service: Not on file     Active member of club or organization: Not on file     Attends meetings of clubs or organizations: Not on file     Relationship status: Not on file     Intimate partner violence     Fear of current or ex partner: Not on file     Emotionally abused: Not on file     Physically abused: Not on file     Forced sexual activity: Not on file   Other Topics Concern     Not on file   Social History Narrative     Not on file      Current Outpatient Medications   Medication Sig Dispense Refill     fluticasone propionate (FLONASE) 50 mcg/actuation nasal spray 2 sprays into each nostril daily. 16 g 0     loratadine (CLARITIN) 10 mg tablet Take 1 tablet (10 mg total) by mouth daily. 30 tablet 2     No current facility-administered medications for this visit.       Objective:   Vital Signs:   Visit Vitals  BP 90/62 (Patient Site: Right Arm, Patient Position: Sitting)   Pulse 84   Temp 97.4  F (36.3  C)   Ht 5' 8\" (1.727 m)   Wt 149 lb " (67.6 kg)   LMP  (LMP Unknown)   SpO2 99%   BMI 22.66 kg/m           VisionScreening:  No exam data present     PHYSICAL EXAM  General:  Patient is alert and in no apparent distress.  Neck:  Supple with no adenopathy or thyroid abnormality noted.  Cardiovascular:  Regular rate and rhythm, normal S1/S2, no murmurs, rubs, or gallop.  Pulmonary:  Lungs are clear to auscultation bilaterally with normal respiratory effort.  Gastrointestinal:  Abdomen is soft, non-tender, non-distended, with no organomegaly, rebound or guarding.  Extremities:  No joint abnormalities with no LE edema.  Strong dorsalis pedis pulses.  Neurologic Cranial nerves are intact.  No focal deficits.  Psychiatric:  Pleasant, no confusion or agitation   Skin:  Warm and well perfused with no rashes or abnormalities.  Breast: Normal breast tissue with no masses or abnormalities.  Pelvic: Normal external genitalia and normal hair distribution.  Bimanual exam with no cervical motion tenderness or masses.      Assessment Results 12/3/2020   Activities of Daily Living No help needed   Instrumental Activities of Daily Living No help needed   Mini Cog Total Score 5   Some recent data might be hidden     A Mini-Cog score of 0-2 suggests the possibility of dementia, score of 3-5 suggests no dementia    Identified Health Risks:     Patient's advanced directive was discussed and I am comfortable with the patient's wishes.

## 2021-06-16 PROBLEM — R79.89 PROLACTIN INCREASED: Status: ACTIVE | Noted: 2019-08-22

## 2021-06-19 NOTE — LETTER
Letter by Claire Pond MD at      Author: Claire Pond MD Service: -- Author Type: --    Filed:  Encounter Date: 12/2/2019 Status: Signed         Yancy Story  [address] [place] [address]             December 2, 2019         Dear Ms. Story,    Below are the results from your recent visit:    Resulted Orders   HM2(CBC w/o Differential)   Result Value Ref Range    WBC 4.6 4.0 - 11.0 thou/uL    RBC 4.32 3.80 - 5.40 mill/uL    Hemoglobin 13.5 12.0 - 16.0 g/dL    Hematocrit 40.1 35.0 - 47.0 %    MCV 93 80 - 100 fL    MCH 31.3 27.0 - 34.0 pg    MCHC 33.8 32.0 - 36.0 g/dL    RDW 12.2 11.0 - 14.5 %    Platelets 214 140 - 440 thou/uL    MPV 6.9 (L) 7.0 - 10.0 fL   Lipid Cascade   Result Value Ref Range    Cholesterol 187 <=199 mg/dL    Triglycerides 41 <=149 mg/dL    HDL Cholesterol 76 >=50 mg/dL    LDL Calculated 103 <=129 mg/dL    Patient Fasting > 8hrs? Yes    Comprehensive Metabolic Panel   Result Value Ref Range    Sodium 140 136 - 145 mmol/L    Potassium 4.2 3.5 - 5.0 mmol/L    Chloride 108 (H) 98 - 107 mmol/L    CO2 25 22 - 31 mmol/L    Anion Gap, Calculation 7 5 - 18 mmol/L    Glucose 87 70 - 125 mg/dL    BUN 15 8 - 22 mg/dL    Creatinine 0.80 0.60 - 1.10 mg/dL    GFR MDRD Af Amer >60 >60 mL/min/1.73m2    GFR MDRD Non Af Amer >60 >60 mL/min/1.73m2    Bilirubin, Total 0.8 0.0 - 1.0 mg/dL    Calcium 9.2 8.5 - 10.5 mg/dL    Protein, Total 6.7 6.0 - 8.0 g/dL    Albumin 3.9 3.5 - 5.0 g/dL    Alkaline Phosphatase 73 45 - 120 U/L    AST 18 0 - 40 U/L    ALT 15 0 - 45 U/L    Narrative    Fasting Glucose reference range is 70-99 mg/dL per  American Diabetes Association (ADA) guidelines.   Thyroid Cascade   Result Value Ref Range    TSH 1.82 0.30 - 5.00 uIU/mL   Prolactin   Result Value Ref Range    Prolactin 33.9 (H) 0.0 - 20.0 ng/mL       Hi Yancy.  Your labs were satisfactory today including your CBC, kidney function, thyroid function, glucose, and lipids.  Your Prolactin level is high but  stable and even a little lower than last year.  Please let me know if you have any questions.        Sincerely,        Electronically signed by Claire Pond MD

## 2021-07-03 NOTE — ADDENDUM NOTE
Addendum Note by María Pond MD at 12/2/2019  9:00 AM     Author: María Pond MD Service: -- Author Type: Physician    Filed: 2/11/2020  4:33 PM Encounter Date: 12/2/2019 Status: Signed    : María Pond MD (Physician)    Addended by: MARÍA POND on: 2/11/2020 04:33 PM        Modules accepted: Orders

## 2021-10-10 ENCOUNTER — HEALTH MAINTENANCE LETTER (OUTPATIENT)
Age: 66
End: 2021-10-10

## 2022-05-22 ENCOUNTER — HEALTH MAINTENANCE LETTER (OUTPATIENT)
Age: 67
End: 2022-05-22

## 2022-09-18 ENCOUNTER — HEALTH MAINTENANCE LETTER (OUTPATIENT)
Age: 67
End: 2022-09-18

## 2023-01-29 ENCOUNTER — HEALTH MAINTENANCE LETTER (OUTPATIENT)
Age: 68
End: 2023-01-29

## 2024-02-25 ENCOUNTER — HEALTH MAINTENANCE LETTER (OUTPATIENT)
Age: 69
End: 2024-02-25